# Patient Record
Sex: FEMALE | Race: BLACK OR AFRICAN AMERICAN | NOT HISPANIC OR LATINO | Employment: OTHER | URBAN - METROPOLITAN AREA
[De-identification: names, ages, dates, MRNs, and addresses within clinical notes are randomized per-mention and may not be internally consistent; named-entity substitution may affect disease eponyms.]

---

## 2018-01-24 ENCOUNTER — ESTABLISHED PATIENT (OUTPATIENT)
Dept: URBAN - METROPOLITAN AREA CLINIC 46 | Facility: CLINIC | Age: 73
End: 2018-01-24

## 2018-01-24 DIAGNOSIS — H40.023: ICD-10-CM

## 2018-01-24 PROCEDURE — 76514 ECHO EXAM OF EYE THICKNESS: CPT

## 2018-01-24 PROCEDURE — 92133 CPTRZD OPH DX IMG PST SGM ON: CPT

## 2018-01-24 PROCEDURE — 1036F TOBACCO NON-USER: CPT

## 2018-01-24 PROCEDURE — 92012 INTRM OPH EXAM EST PATIENT: CPT

## 2018-01-24 PROCEDURE — G8428 CUR MEDS NOT DOCUMENT: HCPCS

## 2018-01-24 ASSESSMENT — TONOMETRY
OS_IOP_MMHG: 16
OD_IOP_MMHG: 16

## 2018-01-24 ASSESSMENT — VISUAL ACUITY
OS_CC: 20/40
OD_CC: 20/40
OS_AM: 20/25-3
OD_PAM: 20/25

## 2018-07-25 ENCOUNTER — ESTABLISHED COMPREHENSIVE EXAM (OUTPATIENT)
Dept: URBAN - METROPOLITAN AREA CLINIC 46 | Facility: CLINIC | Age: 73
End: 2018-07-25

## 2018-07-25 DIAGNOSIS — H40.023: ICD-10-CM

## 2018-07-25 PROCEDURE — 92015 DETERMINE REFRACTIVE STATE: CPT

## 2018-07-25 PROCEDURE — 92083 EXTENDED VISUAL FIELD XM: CPT

## 2018-07-25 PROCEDURE — 92014 COMPRE OPH EXAM EST PT 1/>: CPT

## 2018-07-25 PROCEDURE — G8427 DOCREV CUR MEDS BY ELIG CLIN: HCPCS

## 2018-07-25 PROCEDURE — 1036F TOBACCO NON-USER: CPT

## 2018-07-25 ASSESSMENT — VISUAL ACUITY
OD_PH: 20/25-1
OD_CC: 20/40
OD_SC: 20/60+2
OS_SC: 20/50+2
OD_CC: J2-2
OD_SC: J5-2
OS_PH: 20/25-1
OS_SC: J2-1
OS_CC: J1-1
OS_CC: 20/30

## 2018-07-25 ASSESSMENT — TONOMETRY
OS_IOP_MMHG: 16
OD_IOP_MMHG: 16

## 2018-08-28 ENCOUNTER — FOLLOW UP (OUTPATIENT)
Dept: URBAN - METROPOLITAN AREA CLINIC 46 | Facility: CLINIC | Age: 73
End: 2018-08-28

## 2018-08-28 DIAGNOSIS — H40.023: ICD-10-CM

## 2018-08-28 PROCEDURE — 92012 INTRM OPH EXAM EST PATIENT: CPT

## 2018-08-28 PROCEDURE — 92083 EXTENDED VISUAL FIELD XM: CPT

## 2018-08-28 PROCEDURE — G9903 PT SCRN TBCO ID AS NON USER: HCPCS

## 2018-08-28 PROCEDURE — 1036F TOBACCO NON-USER: CPT

## 2018-08-28 PROCEDURE — G8427 DOCREV CUR MEDS BY ELIG CLIN: HCPCS

## 2018-08-28 ASSESSMENT — TONOMETRY
OS_IOP_MMHG: 14
OD_IOP_MMHG: 15

## 2018-08-28 ASSESSMENT — VISUAL ACUITY
OS_CC: 20/50+2
OD_CC: 20/40

## 2019-07-29 ENCOUNTER — ESTABLISHED COMPREHENSIVE EXAM (OUTPATIENT)
Dept: URBAN - METROPOLITAN AREA CLINIC 46 | Facility: CLINIC | Age: 74
End: 2019-07-29

## 2019-07-29 VITALS — HEIGHT: 55 IN | SYSTOLIC BLOOD PRESSURE: 120 MMHG | DIASTOLIC BLOOD PRESSURE: 85 MMHG

## 2019-07-29 DIAGNOSIS — H52.7: ICD-10-CM

## 2019-07-29 DIAGNOSIS — H25.9: ICD-10-CM

## 2019-07-29 DIAGNOSIS — H40.023: ICD-10-CM

## 2019-07-29 PROCEDURE — 92015 DETERMINE REFRACTIVE STATE: CPT

## 2019-07-29 PROCEDURE — 92014 COMPRE OPH EXAM EST PT 1/>: CPT

## 2019-07-29 ASSESSMENT — VISUAL ACUITY
OS_CC: 20/25-2
OS_CC: J3
OD_SC: J5
OS_SC: 20/40-2
OS_SC: J5
OD_RAM: 20/20-1
OD_BAT: 20/>400
OD_SC: 20/40-1
OD_CC: 20/40+1
OS_BAT: 20/>400
OD_CC: J3
OS_AM: -1

## 2019-07-29 ASSESSMENT — TONOMETRY
OD_IOP_MMHG: 15
OS_IOP_MMHG: 15

## 2019-11-18 ENCOUNTER — FOLLOW UP (OUTPATIENT)
Dept: URBAN - METROPOLITAN AREA CLINIC 46 | Facility: CLINIC | Age: 74
End: 2019-11-18

## 2019-11-18 DIAGNOSIS — H40.023: ICD-10-CM

## 2019-11-18 PROCEDURE — 92133 CPTRZD OPH DX IMG PST SGM ON: CPT

## 2019-11-18 PROCEDURE — 92012 INTRM OPH EXAM EST PATIENT: CPT

## 2019-11-18 ASSESSMENT — TONOMETRY
OD_IOP_MMHG: 14
OS_IOP_MMHG: 13

## 2019-11-18 ASSESSMENT — VISUAL ACUITY
OS_SC: 20/40
OD_SC: 20/40

## 2019-12-11 ENCOUNTER — TECH ONLY (OUTPATIENT)
Dept: URBAN - METROPOLITAN AREA CLINIC 46 | Facility: CLINIC | Age: 74
End: 2019-12-11

## 2019-12-11 DIAGNOSIS — H40.023: ICD-10-CM

## 2019-12-11 PROCEDURE — 99211T TECH SERVICE

## 2019-12-11 PROCEDURE — 92083 EXTENDED VISUAL FIELD XM: CPT

## 2019-12-16 ENCOUNTER — FOLLOW UP (OUTPATIENT)
Dept: URBAN - METROPOLITAN AREA CLINIC 46 | Facility: CLINIC | Age: 74
End: 2019-12-16

## 2019-12-16 DIAGNOSIS — H40.023: ICD-10-CM

## 2019-12-16 PROCEDURE — 92012 INTRM OPH EXAM EST PATIENT: CPT

## 2019-12-16 ASSESSMENT — TONOMETRY
OD_IOP_MMHG: 15
OS_IOP_MMHG: 13

## 2019-12-16 ASSESSMENT — VISUAL ACUITY
OD_CC: 20/40-1
OS_CC: 20/40-2

## 2020-02-11 ENCOUNTER — CONSULT (OUTPATIENT)
Dept: URBAN - METROPOLITAN AREA CLINIC 46 | Facility: CLINIC | Age: 75
End: 2020-02-11

## 2020-02-11 DIAGNOSIS — H40.023: ICD-10-CM

## 2020-02-11 DIAGNOSIS — H25.813: ICD-10-CM

## 2020-02-11 PROCEDURE — 76514 ECHO EXAM OF EYE THICKNESS: CPT

## 2020-02-11 PROCEDURE — 92014 COMPRE OPH EXAM EST PT 1/>: CPT

## 2020-02-11 PROCEDURE — 92020 GONIOSCOPY: CPT

## 2020-02-11 PROCEDURE — 92250 FUNDUS PHOTOGRAPHY W/I&R: CPT

## 2020-02-11 ASSESSMENT — PACHYMETRY
OD_CT_UM: 495
OS_CT_UM: 500

## 2020-02-11 ASSESSMENT — TONOMETRY
OS_IOP_MMHG: 13
OD_IOP_MMHG: 14

## 2020-02-11 ASSESSMENT — VISUAL ACUITY
OD_SC: 20/60
OD_CC: 20/40
OS_BAT: <20/400
OS_CC: 20/40-1
OD_SC: J5
OS_SC: 20/50
OD_CC: J2
OS_CC: J2
OS_SC: J6
OD_BAT: <20/400

## 2020-08-12 ENCOUNTER — IOP CHECK (OUTPATIENT)
Dept: URBAN - METROPOLITAN AREA CLINIC 46 | Facility: CLINIC | Age: 75
End: 2020-08-12

## 2020-08-12 DIAGNOSIS — H25.813: ICD-10-CM

## 2020-08-12 DIAGNOSIS — H40.023: ICD-10-CM

## 2020-08-12 PROCEDURE — 92012 INTRM OPH EXAM EST PATIENT: CPT

## 2020-08-12 ASSESSMENT — VISUAL ACUITY
OS_CC: 20/30+1
OD_CC: 20/40
OU_CC: 20/25-1

## 2020-08-12 ASSESSMENT — TONOMETRY
OS_IOP_MMHG: 14
OD_IOP_MMHG: 14

## 2021-02-12 ENCOUNTER — IOP CHECK (OUTPATIENT)
Dept: URBAN - METROPOLITAN AREA CLINIC 46 | Facility: CLINIC | Age: 76
End: 2021-02-12

## 2021-02-12 DIAGNOSIS — H25.813: ICD-10-CM

## 2021-02-12 DIAGNOSIS — H35.033: ICD-10-CM

## 2021-02-12 DIAGNOSIS — H40.023: ICD-10-CM

## 2021-02-12 DIAGNOSIS — H04.123: ICD-10-CM

## 2021-02-12 PROCEDURE — 92133 CPTRZD OPH DX IMG PST SGM ON: CPT

## 2021-02-12 PROCEDURE — 92012 INTRM OPH EXAM EST PATIENT: CPT

## 2021-02-12 PROCEDURE — 92083 EXTENDED VISUAL FIELD XM: CPT

## 2021-02-12 ASSESSMENT — VISUAL ACUITY
OD_CC: 20/40
OS_CC: 20/30

## 2021-02-12 ASSESSMENT — TONOMETRY
OD_IOP_MMHG: 15
OS_IOP_MMHG: 15

## 2021-08-19 ENCOUNTER — ESTABLISHED COMPREHENSIVE EXAM (OUTPATIENT)
Dept: URBAN - METROPOLITAN AREA CLINIC 46 | Facility: CLINIC | Age: 76
End: 2021-08-19

## 2021-08-19 DIAGNOSIS — H40.023: ICD-10-CM

## 2021-08-19 DIAGNOSIS — H04.123: ICD-10-CM

## 2021-08-19 DIAGNOSIS — H25.813: ICD-10-CM

## 2021-08-19 DIAGNOSIS — H52.7: ICD-10-CM

## 2021-08-19 DIAGNOSIS — H35.033: ICD-10-CM

## 2021-08-19 PROCEDURE — 92015 DETERMINE REFRACTIVE STATE: CPT

## 2021-08-19 PROCEDURE — 92014 COMPRE OPH EXAM EST PT 1/>: CPT

## 2021-08-19 ASSESSMENT — TONOMETRY
OD_IOP_MMHG: 15
OS_IOP_MMHG: 15

## 2021-08-19 ASSESSMENT — VISUAL ACUITY
OD_CC: J2
OD_SC: J5+
OD_CC: 20/30-2
OS_SC: 20/40
OD_SC: 20/40-1
OS_SC: J3
OS_CC: 20/30-1
OS_CC: J3

## 2021-11-30 NOTE — PATIENT DISCUSSION
Visually significant PCO present on exam today. Cannot improve vision with refraction/glasses at this time. Discussed treatment options including observation versus Yag capsulotomy. Laser recommended to improve vision. We discussed the risks/benefits of laser capsulotomy. All questions were answered. Patient elects to proceed. Schedule Yag capsulotomy OS. AND LATER IN THE OD IF PROBLEM PERSISTS.

## 2022-02-24 ENCOUNTER — ESTABLISHED PATIENT (OUTPATIENT)
Dept: URBAN - METROPOLITAN AREA CLINIC 46 | Facility: CLINIC | Age: 77
End: 2022-02-24

## 2022-02-24 DIAGNOSIS — H40.023: ICD-10-CM

## 2022-02-24 DIAGNOSIS — H10.45: ICD-10-CM

## 2022-02-24 DIAGNOSIS — H25.813: ICD-10-CM

## 2022-02-24 PROCEDURE — 92012 INTRM OPH EXAM EST PATIENT: CPT

## 2022-02-24 PROCEDURE — 92250 FUNDUS PHOTOGRAPHY W/I&R: CPT

## 2022-02-24 ASSESSMENT — TONOMETRY
OS_IOP_MMHG: 18
OD_IOP_MMHG: 17

## 2022-02-24 ASSESSMENT — VISUAL ACUITY
OD_PH: 20/30-2
OU_CC: 20/40
OS_PH: 20/40+1
OS_CC: 20/50-1
OD_CC: 20/50

## 2022-04-15 ENCOUNTER — CONSULTATION/EVALUATION (OUTPATIENT)
Dept: URBAN - METROPOLITAN AREA CLINIC 46 | Facility: CLINIC | Age: 77
End: 2022-04-15

## 2022-04-15 DIAGNOSIS — H04.123: ICD-10-CM

## 2022-04-15 DIAGNOSIS — H40.023: ICD-10-CM

## 2022-04-15 DIAGNOSIS — H25.813: ICD-10-CM

## 2022-04-15 DIAGNOSIS — H35.033: ICD-10-CM

## 2022-04-15 PROCEDURE — 92136TC INTERFEROMETRY - TECHNICAL COMPONENT

## 2022-04-15 PROCEDURE — 92014 COMPRE OPH EXAM EST PT 1/>: CPT

## 2022-04-15 PROCEDURE — 92202 OPSCPY EXTND ON/MAC DRAW: CPT

## 2022-04-15 PROCEDURE — 92134 CPTRZ OPH DX IMG PST SGM RTA: CPT

## 2022-04-15 PROCEDURE — 92020 GONIOSCOPY: CPT

## 2022-04-15 PROCEDURE — 92133 CPTRZD OPH DX IMG PST SGM ON: CPT

## 2022-04-15 ASSESSMENT — VISUAL ACUITY
OD_BAT: <20/400
OS_BAT: <20/400
OD_CC: J2
OD_SC: J3
OD_CC: 20/30-2
OS_CC: 20/25-2
OD_RAM: 20/25-2
OS_CC: J2
OS_SC: 20/50
OS_AM: 20/20-2
OS_SC: J3
OD_SC: 20/60

## 2022-04-15 ASSESSMENT — TONOMETRY
OS_IOP_MMHG: 13
OD_IOP_MMHG: 14

## 2025-08-28 ENCOUNTER — COMPREHENSIVE EXAM (OUTPATIENT)
Age: 80
End: 2025-08-28

## 2025-08-28 DIAGNOSIS — H04.123: ICD-10-CM

## 2025-08-28 DIAGNOSIS — H10.45: ICD-10-CM

## 2025-08-28 DIAGNOSIS — H40.023: ICD-10-CM

## 2025-08-28 DIAGNOSIS — H04.201: ICD-10-CM

## 2025-08-28 DIAGNOSIS — H25.813: ICD-10-CM

## 2025-08-28 DIAGNOSIS — H52.7: ICD-10-CM

## 2025-08-28 DIAGNOSIS — H35.033: ICD-10-CM

## 2025-08-28 PROCEDURE — 92014 COMPRE OPH EXAM EST PT 1/>: CPT

## 2025-08-28 PROCEDURE — 92015 DETERMINE REFRACTIVE STATE: CPT
